# Patient Record
Sex: FEMALE | Race: WHITE
[De-identification: names, ages, dates, MRNs, and addresses within clinical notes are randomized per-mention and may not be internally consistent; named-entity substitution may affect disease eponyms.]

---

## 2023-09-12 ENCOUNTER — HOSPITAL ENCOUNTER (OUTPATIENT)
Dept: HOSPITAL 75 - RAD | Age: 28
End: 2023-09-12
Attending: FAMILY MEDICINE
Payer: COMMERCIAL

## 2023-09-12 DIAGNOSIS — N97.9: Primary | ICD-10-CM

## 2023-09-12 DIAGNOSIS — N92.6: ICD-10-CM

## 2023-09-12 PROCEDURE — 76856 US EXAM PELVIC COMPLETE: CPT

## 2023-09-12 PROCEDURE — 76830 TRANSVAGINAL US NON-OB: CPT

## 2023-09-12 NOTE — DIAGNOSTIC IMAGING REPORT
PROCEDURE: Pelvic comp/transvaginal sonogram.



TECHNIQUE: Complete transabdominal and transvaginal pelvic

ultrasound was performed. In addition, limited pelvic Doppler was

performed.



INDICATION: Menstrual irregularity and infertility.



The uterus is retroverted measuring 7.9 x 5.6 x 6.0 cm. There is

a questionable fibroid in the mid uterus approximately 3.7 cm in

size. This obscures the endometrium. Right ovary could not be

visualized due to overlying bowel gas. Left ovary measures 2.7 x

2.0 x 2.8 cm. There are follicles in the left ovary. There is

blood flow to the left ovary. No adnexal mass or free fluid is

detected.



IMPRESSION: 

1. Probable uterine fibroid, obscuring the endometrium. 

2. No other significant abnormality is detected.







Dictated by: 



  Dictated on workstation # [id number]

## 2023-10-16 ENCOUNTER — HOSPITAL ENCOUNTER (OUTPATIENT)
Dept: HOSPITAL 75 - PREOP | Age: 28
Discharge: HOME | End: 2023-10-16
Attending: OBSTETRICS & GYNECOLOGY
Payer: COMMERCIAL

## 2023-10-16 VITALS — BODY MASS INDEX: 32.85 KG/M2 | HEIGHT: 62.99 IN | WEIGHT: 185.41 LBS

## 2023-10-16 DIAGNOSIS — Z01.818: Primary | ICD-10-CM

## 2023-10-23 ENCOUNTER — HOSPITAL ENCOUNTER (OUTPATIENT)
Dept: HOSPITAL 75 - SDC | Age: 28
Discharge: HOME | End: 2023-10-23
Attending: OBSTETRICS & GYNECOLOGY
Payer: COMMERCIAL

## 2023-10-23 VITALS — DIASTOLIC BLOOD PRESSURE: 48 MMHG | SYSTOLIC BLOOD PRESSURE: 91 MMHG

## 2023-10-23 VITALS — SYSTOLIC BLOOD PRESSURE: 88 MMHG | DIASTOLIC BLOOD PRESSURE: 46 MMHG

## 2023-10-23 VITALS — SYSTOLIC BLOOD PRESSURE: 109 MMHG | DIASTOLIC BLOOD PRESSURE: 71 MMHG

## 2023-10-23 VITALS — DIASTOLIC BLOOD PRESSURE: 70 MMHG | SYSTOLIC BLOOD PRESSURE: 112 MMHG

## 2023-10-23 VITALS — DIASTOLIC BLOOD PRESSURE: 53 MMHG | SYSTOLIC BLOOD PRESSURE: 116 MMHG

## 2023-10-23 VITALS — WEIGHT: 185.41 LBS | HEIGHT: 62.99 IN | BODY MASS INDEX: 32.85 KG/M2

## 2023-10-23 VITALS — SYSTOLIC BLOOD PRESSURE: 86 MMHG | DIASTOLIC BLOOD PRESSURE: 55 MMHG

## 2023-10-23 VITALS — DIASTOLIC BLOOD PRESSURE: 63 MMHG | SYSTOLIC BLOOD PRESSURE: 103 MMHG

## 2023-10-23 VITALS — DIASTOLIC BLOOD PRESSURE: 75 MMHG | SYSTOLIC BLOOD PRESSURE: 107 MMHG

## 2023-10-23 VITALS — DIASTOLIC BLOOD PRESSURE: 46 MMHG | SYSTOLIC BLOOD PRESSURE: 90 MMHG

## 2023-10-23 DIAGNOSIS — Z28.310: ICD-10-CM

## 2023-10-23 DIAGNOSIS — Z87.891: ICD-10-CM

## 2023-10-23 DIAGNOSIS — N87.1: Primary | ICD-10-CM

## 2023-10-23 DIAGNOSIS — E28.2: ICD-10-CM

## 2023-10-23 DIAGNOSIS — N97.9: ICD-10-CM

## 2023-10-23 PROCEDURE — 87081 CULTURE SCREEN ONLY: CPT

## 2023-10-23 PROCEDURE — 84703 CHORIONIC GONADOTROPIN ASSAY: CPT

## 2023-10-23 RX ADMIN — SODIUM CHLORIDE, SODIUM LACTATE, POTASSIUM CHLORIDE, AND CALCIUM CHLORIDE PRN MLS/HR: 600; 310; 30; 20 INJECTION, SOLUTION INTRAVENOUS at 08:39

## 2023-10-23 RX ADMIN — SODIUM CHLORIDE, SODIUM LACTATE, POTASSIUM CHLORIDE, AND CALCIUM CHLORIDE PRN MLS/HR: 600; 310; 30; 20 INJECTION, SOLUTION INTRAVENOUS at 09:42

## 2023-10-23 NOTE — DISCHARGE INST-SIMPLE/STANDARD
Discharge Inst-Standard


Reconcile Patient Problems


Problems Reviewed?:  Yes





Discharge Medications


New, Converted or Re-Newed RX:  Transmitted to Pharmacy





Patient Instructions/Follow Up


Plan of Care/Instructions/FU:  


Follow-up in 1 week.


Pelvic rest for 2 weeks


Activity as Tolerated:  Yes


Discharge Diet:  Regular Diet











SAI TALAVERA DO          Oct 23, 2023 10:18

## 2023-10-23 NOTE — PROGRESS NOTE-PRE OPERATIVE
Pre-Operative Progress Note


Date of Available H&P:  Oct 23, 2023


Date H&P Reviewed:  Oct 23, 2023


Time H&P Reviewed:  08:40


History & Physical:  H&P Reviewed, No changes noted


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

## 2023-10-23 NOTE — ANESTHESIA-GENERAL POST-OP
General


Patient Condition


Mental Status/LOC:  Same as Preop


Cardiovascular:  Satisfactory


Nausea/Vomiting:  Absent


Respiratory:  Satisfactory


Pain:  Controlled


Complications:  Absent





Post Op Complications


Complications


None





Follow Up Care/Instructions


Patient Instructions


None needed.





Anesthesia/Patient Condition


Patient Condition


Patient is back in [facility] [place], [place] and doing well, no complaints, stable vital signs, no 

apparent adverse anesthesia problems.   


No complications reported per nursing.











WALKER PATEL DO         Oct 23, 2023 10:22
